# Patient Record
Sex: MALE | Race: WHITE | Employment: OTHER | URBAN - METROPOLITAN AREA
[De-identification: names, ages, dates, MRNs, and addresses within clinical notes are randomized per-mention and may not be internally consistent; named-entity substitution may affect disease eponyms.]

---

## 2018-12-13 ENCOUNTER — HOSPITAL ENCOUNTER (OUTPATIENT)
Age: 73
Discharge: HOME | End: 2018-12-15
Payer: MEDICARE

## 2018-12-13 LAB — PROSTATE SPECIFIC ANTIGEN: 0.6 NG/ML (ref 0–4)

## 2018-12-13 PROCEDURE — G0103 PSA SCREENING: HCPCS

## 2020-03-17 ENCOUNTER — APPOINTMENT (OUTPATIENT)
Dept: CT IMAGING | Age: 75
End: 2020-03-17
Payer: MEDICARE

## 2020-03-17 ENCOUNTER — HOSPITAL ENCOUNTER (EMERGENCY)
Age: 75
Discharge: HOME | End: 2020-03-17
Attending: EMERGENCY MEDICINE
Payer: MEDICARE

## 2020-03-17 VITALS
RESPIRATION RATE: 16 BRPM | HEIGHT: 68 IN | WEIGHT: 145 LBS | SYSTOLIC BLOOD PRESSURE: 175 MMHG | DIASTOLIC BLOOD PRESSURE: 94 MMHG | BODY MASS INDEX: 21.98 KG/M2 | OXYGEN SATURATION: 96 % | TEMPERATURE: 98.1 F | HEART RATE: 65 BPM

## 2020-03-17 LAB
ALBUMIN SERPL-MCNC: 3.5 G/DL (ref 3.5–5.2)
ALP BLD-CCNC: 102 U/L (ref 40–129)
ALT SERPL-CCNC: 319 U/L (ref 0–40)
ANION GAP SERPL CALCULATED.3IONS-SCNC: 9 MMOL/L (ref 7–16)
AST SERPL-CCNC: 182 U/L (ref 0–39)
BASOPHILS ABSOLUTE: 0.16 E9/L (ref 0–0.2)
BASOPHILS RELATIVE PERCENT: 2.3 % (ref 0–2)
BILIRUB SERPL-MCNC: 0.8 MG/DL (ref 0–1.2)
BILIRUBIN DIRECT: 0.3 MG/DL (ref 0–0.3)
BILIRUBIN, INDIRECT: 0.5 MG/DL (ref 0–1)
BUN BLDV-MCNC: 14 MG/DL (ref 8–23)
CALCIUM SERPL-MCNC: 8.6 MG/DL (ref 8.6–10.2)
CHLORIDE BLD-SCNC: 102 MMOL/L (ref 98–107)
CO2: 27 MMOL/L (ref 22–29)
CREAT SERPL-MCNC: 1.9 MG/DL (ref 0.7–1.2)
EOSINOPHILS ABSOLUTE: 0.31 E9/L (ref 0.05–0.5)
EOSINOPHILS RELATIVE PERCENT: 4.4 % (ref 0–6)
GFR AFRICAN AMERICAN: 42
GFR NON-AFRICAN AMERICAN: 35 ML/MIN/1.73
GLUCOSE BLD-MCNC: 155 MG/DL (ref 74–99)
HCT VFR BLD CALC: 40.7 % (ref 37–54)
HEMOGLOBIN: 13.4 G/DL (ref 12.5–16.5)
IMMATURE GRANULOCYTES #: 0.02 E9/L
IMMATURE GRANULOCYTES %: 0.3 % (ref 0–5)
INR BLD: 1.1
LYMPHOCYTES ABSOLUTE: 1.7 E9/L (ref 1.5–4)
LYMPHOCYTES RELATIVE PERCENT: 24.4 % (ref 20–42)
MCH RBC QN AUTO: 31.1 PG (ref 26–35)
MCHC RBC AUTO-ENTMCNC: 32.9 % (ref 32–34.5)
MCV RBC AUTO: 94.4 FL (ref 80–99.9)
MONOCYTES ABSOLUTE: 0.95 E9/L (ref 0.1–0.95)
MONOCYTES RELATIVE PERCENT: 13.6 % (ref 2–12)
NEUTROPHILS ABSOLUTE: 3.84 E9/L (ref 1.8–7.3)
NEUTROPHILS RELATIVE PERCENT: 55 % (ref 43–80)
PDW BLD-RTO: 14.7 FL (ref 11.5–15)
PLATELET # BLD: 109 E9/L (ref 130–450)
PMV BLD AUTO: 9.8 FL (ref 7–12)
POTASSIUM SERPL-SCNC: 4.3 MMOL/L (ref 3.5–5)
PROTHROMBIN TIME: 12.6 SEC (ref 9.3–12.4)
RBC # BLD: 4.31 E12/L (ref 3.8–5.8)
SODIUM BLD-SCNC: 138 MMOL/L (ref 132–146)
TOTAL PROTEIN: 6.6 G/DL (ref 6.4–8.3)
WBC # BLD: 7 E9/L (ref 4.5–11.5)

## 2020-03-17 PROCEDURE — 80076 HEPATIC FUNCTION PANEL: CPT

## 2020-03-17 PROCEDURE — 80074 ACUTE HEPATITIS PANEL: CPT

## 2020-03-17 PROCEDURE — 36415 COLL VENOUS BLD VENIPUNCTURE: CPT

## 2020-03-17 PROCEDURE — 2580000003 HC RX 258: Performed by: EMERGENCY MEDICINE

## 2020-03-17 PROCEDURE — 85610 PROTHROMBIN TIME: CPT

## 2020-03-17 PROCEDURE — 80048 BASIC METABOLIC PNL TOTAL CA: CPT

## 2020-03-17 PROCEDURE — 99284 EMERGENCY DEPT VISIT MOD MDM: CPT

## 2020-03-17 PROCEDURE — 85025 COMPLETE CBC W/AUTO DIFF WBC: CPT

## 2020-03-17 PROCEDURE — 74176 CT ABD & PELVIS W/O CONTRAST: CPT

## 2020-03-17 RX ORDER — 0.9 % SODIUM CHLORIDE 0.9 %
500 INTRAVENOUS SOLUTION INTRAVENOUS ONCE
Status: COMPLETED | OUTPATIENT
Start: 2020-03-17 | End: 2020-03-17

## 2020-03-17 RX ADMIN — SODIUM CHLORIDE 500 ML: 9 INJECTION, SOLUTION INTRAVENOUS at 12:34

## 2020-03-17 ASSESSMENT — ENCOUNTER SYMPTOMS
BACK PAIN: 0
SINUS PRESSURE: 0
BLOOD IN STOOL: 0
EYE PAIN: 0
DIARRHEA: 0
CONSTIPATION: 0
SORE THROAT: 0
EYE DISCHARGE: 0
WHEEZING: 0
COUGH: 0
VOMITING: 0
SHORTNESS OF BREATH: 0
RHINORRHEA: 0
ABDOMINAL PAIN: 0
EYE REDNESS: 0
NAUSEA: 0

## 2020-03-17 ASSESSMENT — VISUAL ACUITY: OU: 1

## 2020-03-17 NOTE — ED PROVIDER NOTES
nerves are intact. No cranial nerve deficit. Sensory: Sensation is intact. Motor: Motor function is intact. Coordination: Coordination normal.          Procedures     MDM  Number of Diagnoses or Management Options  Aneurysm of infrarenal abdominal aorta [facility]):   Bilateral inguinal hernia without obstruction or gangrene, recurrence not specified:   Pleural effusion:   Renal atrophy:   Renal cyst:   Renal insufficiency: Thrombocytopenia ([facility] [address]):   Transaminitis:   Diagnosis management comments: Upon arrival, patient stated that his PCP sent him here due to elevated liver enzymes. Patient does have elevated liver enzymes. Discussed patient's test results including results of incidental findings on his CT abdomen/pelvis with patient's primary care physician over the phone. PCP states that patient had LFTs in 500s and based on today's results it seems they are trending down. PCP also stated that patient has history of CKD. In addition, discussed all incidental findings on patient's CT abdomen/pelvis with the PCP along with patient's blood test results. PCP stated patient can follow-up with her in her in her office as well as will receive repeat blood work from her office. Discussed patient's findings today along with discussion with his PCP with the patient who is agreeable to plan. All questions were answered. Patient is discharged in stable condition with instructions to follow-up with his PCP or return to ED if he has any symptoms. --------------------------------------------- PAST HISTORY ---------------------------------------------  Past Medical History:  has a past medical history of Diabetes mellitus ([facility] [address] Elevated liver enzymes, Hyperlipidemia, and Hypertension. Past Surgical History:  has a past surgical history that includes hernia repair and Wrist surgery. Social History:  reports that he has been smoking. He has been smoking about 0.25 packs per day.  He has quit using smokeless tobacco. He reports current alcohol use. Family History: family history is not on file. The patients home medications have been reviewed. Allergies:  Wellbutrin [bupropion]    -------------------------------------------------- RESULTS -------------------------------------------------  Labs:  Results for orders placed or performed during the hospital encounter of 03/17/20   CBC Auto Differential   Result Value Ref Range    WBC 7.0 4.5 - 11.5 E9/L    RBC 4.31 3.80 - 5.80 E12/L    Hemoglobin 13.4 12.5 - 16.5 g/dL    Hematocrit 40.7 37.0 - 54.0 %    MCV 94.4 80.0 - 99.9 fL    MCH 31.1 26.0 - 35.0 pg    MCHC 32.9 32.0 - 34.5 %    RDW 14.7 11.5 - 15.0 fL    Platelets 265 (L) 844 - 450 E9/L    MPV 9.8 7.0 - 12.0 fL    Neutrophils % 55.0 43.0 - 80.0 %    Immature Granulocytes % 0.3 0.0 - 5.0 %    Lymphocytes % 24.4 20.0 - 42.0 %    Monocytes % 13.6 (H) 2.0 - 12.0 %    Eosinophils % 4.4 0.0 - 6.0 %    Basophils % 2.3 (H) 0.0 - 2.0 %    Neutrophils Absolute 3.84 1.80 - 7.30 E9/L    Immature Granulocytes # 0.02 E9/L    Lymphocytes Absolute 1.70 1.50 - 4.00 E9/L    Monocytes Absolute 0.95 0.10 - 0.95 E9/L    Eosinophils Absolute 0.31 0.05 - 0.50 E9/L    Basophils Absolute 0.16 0.00 - 0.20 Q4/G   Basic Metabolic Panel   Result Value Ref Range    Sodium 138 132 - 146 mmol/L    Potassium 4.3 3.5 - 5.0 mmol/L    Chloride 102 98 - 107 mmol/L    CO2 27 22 - 29 mmol/L    Anion Gap 9 7 - 16 mmol/L    Glucose 155 (H) 74 - 99 mg/dL    BUN 14 8 - 23 mg/dL    CREATININE 1.9 (H) 0.7 - 1.2 mg/dL    GFR Non-African American 35 >=60 mL/min/1.73    GFR African American 42     Calcium 8.6 8.6 - 10.2 mg/dL   Hepatic function panel   Result Value Ref Range    Total Protein 6.6 6.4 - 8.3 g/dL    Alb 3.5 3.5 - 5.2 g/dL    Alkaline Phosphatase 102 40 - 129 U/L     (H) 0 - 40 U/L     (H) 0 - 39 U/L    Total Bilirubin 0.8 0.0 - 1.2 mg/dL    Bilirubin, Direct 0.3 0.0 - 0.3 mg/dL    Bilirubin, Indirect 0.5 0.0 - 1.0 mg/dL   Protime-INR   Result Value Ref Range    Protime 12.6 (H) 9.3 - 12.4 sec    INR 1.1        Radiology:  CT ABDOMEN PELVIS WO CONTRAST Additional Contrast? None   Final Result      1. Right pleural effusion. 2. 3 cm infrarenal abdominal aortic aneurysm. 3. Right renal atrophy. Left renal cysts with cyst wall   calcifications. 4. Rather large bilateral fat-containing inguinal hernias.                         ------------------------- NURSING NOTES AND VITALS REVIEWED ---------------------------  Date / Time Roomed:  3/17/2020 11:00 AM  ED Bed Assignment:  07/07    The nursing notes within the ED encounter and vital signs as below have been reviewed. /66   Pulse 67   Temp 98.3 °F (36.8 °C) (Oral)   Resp 16   Ht 5' 8\" (1.727 m)   Wt 145 lb (65.8 kg)   SpO2 100%   BMI 22.05 kg/m²   Oxygen Saturation Interpretation: Normal      ------------------------------------------ PROGRESS NOTES ------------------------------------------  12:45 PM  Patient is not in distress. He states he feels fine and has no symptoms. 2:21 PM  Patient is resting comfortably. He states he feels fine. He is not in distress. 3:11 PM EDT  I have spoken with the patient and discussed todays results, in addition to providing specific details for the plan of care and counseling regarding the diagnosis and prognosis. Their questions are answered at this time and they are agreeable with the plan. I discussed at length with them reasons for immediate return here for re evaluation. They will followup with their primary care physician by calling their office tomorrow. --------------------------------- ADDITIONAL PROVIDER NOTES ---------------------------------  At this time the patient is without objective evidence of an acute process requiring hospitalization or inpatient management.   They have remained hemodynamically stable throughout their entire ED visit and are stable for discharge with outpatient follow-up. The plan has been discussed in detail and they are aware of the specific conditions for emergent return, as well as the importance of follow-up. New Prescriptions    No medications on file       Diagnosis:  1. Transaminitis    2. Aneurysm of infrarenal abdominal aorta (HCC)    3. Bilateral inguinal hernia without obstruction or gangrene, recurrence not specified    4. Pleural effusion    5. Renal cyst    6. Renal atrophy    7. Renal insufficiency    8. Thrombocytopenia ([facility] [address]        Disposition:  Patient's disposition: Discharge to home  Patient's condition is stable.             Elizabeth Veronica DO  Resident  03/17/20 [id number]

## 2020-03-17 NOTE — ED NOTES
Received call from Dr. Nayan Nogueira concerned about patients liver enzymes. Dr. Nayan Nogueira requested that blood work be checked as well as an [address] of liver. Message relayed to Dr. Sergei Weiss.      Orestes Tompkins RN  03/17/20 [id number]

## 2020-03-18 LAB
HAV IGM SER IA-ACNC: NORMAL
HEPATITIS B CORE IGM ANTIBODY: NORMAL
HEPATITIS B SURFACE ANTIGEN INTERPRETATION: NORMAL
HEPATITIS C ANTIBODY INTERPRETATION: NORMAL

## 2021-07-08 LAB
BASOPHILS ABSOLUTE: 0.1 K/UL (ref 0–0.2)
BASOPHILS RELATIVE PERCENT: 1.4 % (ref 0–1.5)
CREATININE URINE: 105.7 MG/DL (ref 22–328)
EOSINOPHILS ABSOLUTE: 0.6 K/UL (ref 0–0.33)
EOSINOPHILS RELATIVE PERCENT: 6.9 % (ref 0–3)
HCT VFR BLD CALC: 42.7 % (ref 41–50)
HEMOGLOBIN: 14.9 G/DL (ref 13.5–16.5)
LYMPHOCYTES ABSOLUTE: 1.6 K/UL (ref 1.1–4.8)
LYMPHOCYTES RELATIVE PERCENT: 18.2 % (ref 24–44)
MAGNESIUM: 1.8 MEQ/L (ref 1.6–2.6)
MCH RBC QN AUTO: 33.1 PG (ref 28–34)
MCHC RBC AUTO-ENTMCNC: 35 G/DL (ref 33–37)
MCV RBC AUTO: 94.8 FL (ref 80–100)
MONOCYTES ABSOLUTE: 1.1 K/UL (ref 0.2–0.7)
MONOCYTES RELATIVE PERCENT: 11.9 % (ref 3.4–9)
NEUTROPHILS ABSOLUTE: 5.5 K/UL (ref 1.83–8.7)
PDW BLD-RTO: 13.3 % (ref 10.9–14.3)
PHOSPHORUS: 3.5 MG/DL (ref 2.5–4.6)
PLATELET # BLD: 131 K/UL (ref 150–450)
PMV BLD AUTO: 8.2 FL (ref 7.4–10.4)
PROTEIN/CREAT RATIO URINE RAN: 1107 MG/G
RBC # BLD: 4.5 M/UL (ref 4.5–5.5)
SEGMENTED NEUTROPHILS RELATIVE PERCENT: 61.6 % (ref 40–74)
TOTAL PROTEIN, URINE: 117 MG/DL
URATE: 7.3 MG/DL (ref 4.4–7.6)
WBC # BLD: 8.9 K/UL (ref 4.5–11)

## 2022-10-25 LAB
25-HYDROXY VITAMIN D-3: 20.2 NG/ML (ref 30–100)
ANION GAP SERPL CALCULATED.3IONS-SCNC: 11 MEQ/L (ref 3–11)
BASOPHILS ABSOLUTE: 0.1 K/UL (ref 0–0.2)
BASOPHILS RELATIVE PERCENT: 0.6 % (ref 0–1.5)
BUN BLDV-MCNC: 33 MG/DL (ref 6–20)
CALCIUM SERPL-MCNC: 9.1 MG/DL (ref 8.5–10.5)
CHLORIDE BLD-SCNC: 103 MEQ/L (ref 98–107)
CO2: 24 MEQ/L (ref 21–31)
CREAT SERPL-MCNC: 2 MG/DL (ref 0.6–1.3)
CREATININE + EGFR PANEL: 40 ML/MIN
CREATININE URINE: 73.4 MG/DL (ref 22–328)
EOSINOPHILS ABSOLUTE: 0.6 K/UL (ref 0–0.33)
EOSINOPHILS RELATIVE PERCENT: 6.3 % (ref 0–3)
GFR NON-AFRICAN AMERICAN: 33 ML/MIN
GLUCOSE BLD-MCNC: 134 MG/DL (ref 70–99)
HCT VFR BLD CALC: 42 % (ref 41–50)
HEMOGLOBIN: 14.1 G/DL (ref 13.5–16.5)
LYMPHOCYTES ABSOLUTE: 1.4 K/UL (ref 1.1–4.8)
LYMPHOCYTES RELATIVE PERCENT: 16.5 % (ref 24–44)
MAGNESIUM: 1.7 MEQ/L (ref 1.6–2.6)
MCH RBC QN AUTO: 32 PG (ref 28–34)
MCHC RBC AUTO-ENTMCNC: 33.6 G/DL (ref 33–37)
MCV RBC AUTO: 95 FL (ref 80–100)
MONOCYTES ABSOLUTE: 1.1 K/UL (ref 0.2–0.7)
MONOCYTES RELATIVE PERCENT: 12.5 % (ref 3.4–9)
NEUTROPHILS ABSOLUTE: 5.6 K/UL (ref 1.83–8.7)
PDW BLD-RTO: 14.2 % (ref 10.9–14.3)
PHOSPHORUS: 3.6 MG/DL (ref 2.5–4.6)
PLATELET # BLD: 154 K/UL (ref 150–450)
PMV BLD AUTO: 8.2 FL (ref 7.4–10.4)
POTASSIUM SERPL-SCNC: 5 MEQ/L (ref 3.6–5)
PROTEIN/CREAT RATIO URINE RAN: 1213 MG/G
RBC # BLD: 4.42 M/UL (ref 4.5–5.5)
SEGMENTED NEUTROPHILS RELATIVE PERCENT: 64.1 % (ref 40–74)
SODIUM BLD-SCNC: 138 MEQ/L (ref 135–145)
TOTAL PROTEIN, URINE: 89 MG/DL
URATE: 7.4 MG/DL (ref 4.4–7.6)
WBC # BLD: 8.7 K/UL (ref 4.5–11)

## 2022-10-28 LAB — VITAMIN D 1,25-DIHYDROXY: 46.6 PG/ML (ref 24.8–81.5)
